# Patient Record
(demographics unavailable — no encounter records)

---

## 2025-05-09 NOTE — ASSESSMENT
[FreeTextEntry1] : 65 yo female pt PMHx PUD coming in for follow up. Patient was hospitalized in May 2024 for melena ago and was found to have 2 gastric ulcers (one with active oozing) on EGD. Repeat EGD done in July 2024  confirmed healing of gastric ulcers, biopsy was +ve for HPylori. Pt finished her quadruple therapy, and her H pylori stool Ag test was negative.    # Hx of melena 2/2 PUD 2/2 H pylori s/p eradication  # Microcytic anemia improving  - EGD June 2024: Healed ulcers. H pylori negative  - EGD May 2024: 8 mm clean-based ulcer at the GE junction (Klever III). A 12 mm ulcer with active oozing was seen in the antrum (Klever Ib) (Thermal Therapy, Endoclip) - currently off medications including Protonix - H pylori stool Ag test negative May 3 2025   Plan  - Avoid NSAIDs - If Antiplatelets are needed in the future, she will need to be restarted on PPI    # Low ALK Phos - Duodenal mucosa normal on 2 EGDs, and no malabsorptive symptoms symptoms to suggest celiac disease   # Screening colonoscopy  - C-SCOPE June 2024: 3/3/1, no polyps seen, recommended repeat in one mina brown  PLAN  - will schedule for repeat colonoscopy, risks and benefits discussed - instructions given  - Prep sent

## 2025-05-09 NOTE — HISTORY OF PRESENT ILLNESS
[FreeTextEntry1] : 65 yo female pt PMHx PUD coming in for follow up. Patient was hospitalized in May 2024 for melena ago and was found to have 2 gastric ulcers (one with active oozing) on EGD. Repeat EGD done in July 2024  confirmed healing of gastric ulcers, biopsy was +ve for HPylori. Pt finished her quadruple therapy, and her H pylori stool Ag test was negative.  Pt today denies any abdominal pain, weight loss, melena, hematochezia, hematemesis, difficulty swallowing, or heartburn.

## 2025-05-09 NOTE — PHYSICAL EXAM
[Alert] : alert [No Acute Distress] : no acute distress [No Respiratory Distress] : no respiratory distress [No Acc Muscle Use] : no accessory muscle use [Respiration, Rhythm And Depth] : normal respiratory rhythm and effort [Bowel Sounds] : normal bowel sounds [Abdomen Tenderness] : non-tender [Abdomen Soft] : soft [Oriented To Time, Place, And Person] : oriented to person, place, and time

## 2025-05-09 NOTE — HISTORY OF PRESENT ILLNESS
[FreeTextEntry1] : 63 yo female pt PMHx PUD coming in for follow up. Patient was hospitalized in May 2024 for melena ago and was found to have 2 gastric ulcers (one with active oozing) on EGD. Repeat EGD done in July 2024  confirmed healing of gastric ulcers, biopsy was +ve for HPylori. Pt finished her quadruple therapy, and her H pylori stool Ag test was negative.  Pt today denies any abdominal pain, weight loss, melena, hematochezia, hematemesis, difficulty swallowing, or heartburn.

## 2025-05-09 NOTE — ASSESSMENT
[FreeTextEntry1] : 63 yo female pt PMHx PUD coming in for follow up. Patient was hospitalized in May 2024 for melena ago and was found to have 2 gastric ulcers (one with active oozing) on EGD. Repeat EGD done in July 2024  confirmed healing of gastric ulcers, biopsy was +ve for HPylori. Pt finished her quadruple therapy, and her H pylori stool Ag test was negative.    # Hx of melena 2/2 PUD 2/2 H pylori s/p eradication  # Microcytic anemia improving  - EGD June 2024: Healed ulcers. H pylori negative  - EGD May 2024: 8 mm clean-based ulcer at the GE junction (Klever III). A 12 mm ulcer with active oozing was seen in the antrum (Klever Ib) (Thermal Therapy, Endoclip) - currently off medications including Protonix - H pylori stool Ag test negative May 3 2025   Plan  - Avoid NSAIDs - If Antiplatelets are needed in the future, she will need to be restarted on PPI    # Low ALK Phos - Duodenal mucosa normal on 2 EGDs, and no malabsorptive symptoms symptoms to suggest celiac disease   # Screening colonoscopy  - C-SCOPE June 2024: 3/3/1, no polyps seen, recommended repeat in one mina brown  PLAN  - will schedule for repeat colonoscopy, risks and benefits discussed - instructions given  - Prep sent

## 2025-05-09 NOTE — REVIEW OF SYSTEMS
[Fever] : no fever [Chills] : no chills [Feeling Poorly] : not feeling poorly [Chest Pain] : no chest pain [Palpitations] : no palpitations [Abdominal Pain] : no abdominal pain [Vomiting] : no vomiting [Diarrhea] : no diarrhea [Heartburn] : no heartburn [Melena (black stool)] : no melena [Bleeding] : no bleeding [Confused] : no confusion [Dizziness] : no dizziness [Fainting] : no fainting [Easy Bleeding] : no tendency for easy bleeding

## 2025-07-18 NOTE — PHYSICAL EXAM
[Alert] : alert [No Acute Distress] : no acute distress [Conjunctival Erythema] : conjunctival erythema [Suborbital Bogginess] : suborbital bogginess (allergic shiners) [Boggy Nasal Turbinates] : boggy and/or pale nasal turbinates [Posterior Pharyngeal Cobblestoning] : no posterior pharyngeal cobblestoning [Normal Rate and Effort] : normal respiratory rhythm and effort [No Crackles] : no crackles [Wheezing] : no wheezing was heard [Normal Rate] : heart rate was normal  [Regular Rhythm] : with a regular rhythm [Normal Mood] : mood was normal [Normal Affect] : affect was normal [Judgment and Insight Age Appropriate] : judgement and insight is age appropriate

## 2025-07-18 NOTE — ASSESSMENT
[FreeTextEntry1] : RC:  -ordered labs for further evaluation -c/w anthistamines prn  Discussed to avoid decongestant medications due to high BP  RTC 1 month

## 2025-07-18 NOTE — HISTORY OF PRESENT ILLNESS
[de-identified] : SOCO KAHN is a 64 year years old female who is here today for symptoms that started a long time ago symptoms consist of itchiness to eyes, ears, nose, and throat she also states she gets itchiness to mouth sneezing, congestion and runny nose  she takes Benadryl for her symptoms which helps but just temporarily she suffers from high blood pressure.   Pt reports symptoms controlled w/ benadryl and sometimes uses other antihistamines in place instead such as zyrtec.

## 2025-07-18 NOTE — SOCIAL HISTORY
[House] : [unfilled] lives in a house  [Central Forced Air] : heating provided by central forced air [Central] : air conditioning provided by central unit [None] : none [Humidifier] : does not use a humidifier [Dehumidifier] : does not use a dehumidifier [Dust Mite Covers] : does not have dust mite covers [Feather Pillows] : does not have feather pillows [Feather Comforter] : does not have a feather comforter [Bedroom] : not in the bedroom [Basement] : not in the basement [Living Area] : not in the living area [Smokers in Household] : there are no smokers in the home

## 2025-07-18 NOTE — REVIEW OF SYSTEMS
[Eye Redness] : redness [Eye Itching] : itchy eyes [Dry Eyes] : dryness ~T of the eyes [Puffy Eyelids] : puffy ~T eyelids [Redness Of Eyelid] : redness of ~T eyelid [Swollen Eyelids] : ~T ~L swollen eyelids [Rhinorrhea] : rhinorrhea [Nasal Dryness] : dryness of the nose [Nasal Congestion] : nasal congestion [Nasal Itching] : nasal itching [Throat Itching] : throat itching [Post Nasal Drip] : post nasal drip [Sneezing] : sneezing [Nl] : Genitourinary